# Patient Record
Sex: MALE | Race: WHITE | NOT HISPANIC OR LATINO | Employment: FULL TIME | URBAN - METROPOLITAN AREA
[De-identification: names, ages, dates, MRNs, and addresses within clinical notes are randomized per-mention and may not be internally consistent; named-entity substitution may affect disease eponyms.]

---

## 2024-10-07 ENCOUNTER — HOSPITAL ENCOUNTER (EMERGENCY)
Facility: HOSPITAL | Age: 44
Discharge: HOME | End: 2024-10-07
Attending: EMERGENCY MEDICINE | Admitting: EMERGENCY MEDICINE
Payer: OTHER MISCELLANEOUS

## 2024-10-07 VITALS
SYSTOLIC BLOOD PRESSURE: 153 MMHG | HEART RATE: 88 BPM | OXYGEN SATURATION: 98 % | TEMPERATURE: 97.1 F | BODY MASS INDEX: 31.84 KG/M2 | WEIGHT: 215 LBS | DIASTOLIC BLOOD PRESSURE: 96 MMHG | RESPIRATION RATE: 16 BRPM | HEIGHT: 69 IN

## 2024-10-07 DIAGNOSIS — Z77.21 EXPOSURE TO BLOOD OR BODY FLUID: Primary | ICD-10-CM

## 2024-10-07 DIAGNOSIS — S61.232A PUNCTURE WOUND OF RIGHT MIDDLE FINGER: ICD-10-CM

## 2024-10-07 LAB
ALBUMIN SERPL-MCNC: 4.2 G/DL (ref 3.5–5.2)
ALBUMIN/GLOB SERPL: 1.8 G/DL
ALP SERPL-CCNC: 80 U/L (ref 39–117)
ALT SERPL W P-5'-P-CCNC: 27 U/L (ref 1–41)
ANION GAP SERPL CALCULATED.3IONS-SCNC: 9.4 MMOL/L (ref 5–15)
AST SERPL-CCNC: 22 U/L (ref 1–40)
BILIRUB SERPL-MCNC: 0.6 MG/DL (ref 0–1.2)
BUN SERPL-MCNC: 13 MG/DL (ref 6–20)
BUN/CREAT SERPL: 13.5 (ref 7–25)
CALCIUM SPEC-SCNC: 9.4 MG/DL (ref 8.6–10.5)
CHLORIDE SERPL-SCNC: 105 MMOL/L (ref 98–107)
CO2 SERPL-SCNC: 24.6 MMOL/L (ref 22–29)
CREAT SERPL-MCNC: 0.96 MG/DL (ref 0.76–1.27)
DEPRECATED RDW RBC AUTO: 44 FL (ref 37–54)
EGFRCR SERPLBLD CKD-EPI 2021: 99.3 ML/MIN/1.73
ERYTHROCYTE [DISTWIDTH] IN BLOOD BY AUTOMATED COUNT: 13.3 % (ref 12.3–15.4)
GLOBULIN UR ELPH-MCNC: 2.4 GM/DL
GLUCOSE SERPL-MCNC: 189 MG/DL (ref 65–99)
HBV SURFACE AB SER RIA-ACNC: REACTIVE
HBV SURFACE AG SERPL QL IA: NORMAL
HCT VFR BLD AUTO: 44.4 % (ref 37.5–51)
HCV AB SER QL: NORMAL
HGB BLD-MCNC: 14.7 G/DL (ref 13–17.7)
HIV 1+2 AB+HIV1 P24 AG SERPL QL IA: NORMAL
MCH RBC QN AUTO: 29.8 PG (ref 26.6–33)
MCHC RBC AUTO-ENTMCNC: 33.1 G/DL (ref 31.5–35.7)
MCV RBC AUTO: 90.1 FL (ref 79–97)
PLATELET # BLD AUTO: 246 10*3/MM3 (ref 140–450)
PMV BLD AUTO: 9.1 FL (ref 6–12)
POTASSIUM SERPL-SCNC: 3.9 MMOL/L (ref 3.5–5.2)
PROT SERPL-MCNC: 6.6 G/DL (ref 6–8.5)
RBC # BLD AUTO: 4.93 10*6/MM3 (ref 4.14–5.8)
SODIUM SERPL-SCNC: 139 MMOL/L (ref 136–145)
WBC NRBC COR # BLD AUTO: 9.34 10*3/MM3 (ref 3.4–10.8)

## 2024-10-07 PROCEDURE — 25010000002 TETANUS-DIPHTH-ACELL PERTUSSIS 5-2.5-18.5 LF-MCG/0.5 SUSPENSION PREFILLED SYRINGE: Performed by: PHYSICIAN ASSISTANT

## 2024-10-07 PROCEDURE — G0432 EIA HIV-1/HIV-2 SCREEN: HCPCS | Performed by: PHYSICIAN ASSISTANT

## 2024-10-07 PROCEDURE — 90472 IMMUNIZATION ADMIN EACH ADD: CPT | Performed by: PHYSICIAN ASSISTANT

## 2024-10-07 PROCEDURE — 99283 EMERGENCY DEPT VISIT LOW MDM: CPT

## 2024-10-07 PROCEDURE — 86706 HEP B SURFACE ANTIBODY: CPT | Performed by: PHYSICIAN ASSISTANT

## 2024-10-07 PROCEDURE — 36415 COLL VENOUS BLD VENIPUNCTURE: CPT

## 2024-10-07 PROCEDURE — 86803 HEPATITIS C AB TEST: CPT | Performed by: PHYSICIAN ASSISTANT

## 2024-10-07 PROCEDURE — 90471 IMMUNIZATION ADMIN: CPT | Performed by: PHYSICIAN ASSISTANT

## 2024-10-07 PROCEDURE — 90715 TDAP VACCINE 7 YRS/> IM: CPT | Performed by: PHYSICIAN ASSISTANT

## 2024-10-07 PROCEDURE — 87340 HEPATITIS B SURFACE AG IA: CPT | Performed by: PHYSICIAN ASSISTANT

## 2024-10-07 PROCEDURE — 85027 COMPLETE CBC AUTOMATED: CPT | Performed by: PHYSICIAN ASSISTANT

## 2024-10-07 PROCEDURE — 80053 COMPREHEN METABOLIC PANEL: CPT | Performed by: PHYSICIAN ASSISTANT

## 2024-10-07 RX ORDER — EMTRICITABINE AND TENOFOVIR DISOPROXIL FUMARATE 200; 300 MG/1; MG/1
1 TABLET, FILM COATED ORAL
Status: DISCONTINUED | OUTPATIENT
Start: 2024-10-07 | End: 2024-10-07 | Stop reason: SDUPTHER

## 2024-10-07 RX ORDER — EMTRICITABINE AND TENOFOVIR DISOPROXIL FUMARATE 200; 300 MG/1; MG/1
1 TABLET, FILM COATED ORAL ONCE
Status: COMPLETED | OUTPATIENT
Start: 2024-10-07 | End: 2024-10-07

## 2024-10-07 RX ORDER — EMTRICITABINE AND TENOFOVIR DISOPROXIL FUMARATE 200; 300 MG/1; MG/1
1 TABLET, FILM COATED ORAL DAILY
Qty: 30 TABLET | Refills: 0 | Status: SHIPPED | OUTPATIENT
Start: 2024-10-07

## 2024-10-07 RX ADMIN — TETANUS TOXOID, REDUCED DIPHTHERIA TOXOID AND ACELLULAR PERTUSSIS VACCINE, ADSORBED 0.5 ML: 5; 2.5; 8; 8; 2.5 SUSPENSION INTRAMUSCULAR at 19:05

## 2024-10-07 RX ADMIN — DOLUTEGRAVIR SODIUM 50 MG: 50 TABLET, FILM COATED ORAL at 18:29

## 2024-10-07 RX ADMIN — EMTRICITABINE AND TENOFOVIR DISOPROXIL FUMARATE 1 TABLET: 200; 300 TABLET, FILM COATED ORAL at 18:29

## 2024-10-07 NOTE — ED PROVIDER NOTES
EMERGENCY DEPARTMENT ENCOUNTER  Room Number:  03/03  PCP: Provider, No Known  Independent Historians: Patient      HPI:  Chief Complaint: had concerns including Finger Laceration.     A complete HPI/ROS/PMH/PSH/SH/FH are unobtainable due to: None    Chronic or social conditions impacting patient care (Social Determinants of Health): None      Context: The patient is a 44 y.o. male with a medical history of type 1 diabetes who presents to the ED c/o acute possible body fluid exposure.  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  He states that while working today he got poked in the right middle finger by a skin hook that have been used in the OR.  He did have some bleeding.  It is a little tender, no significant pain.  Denies any functional deficit or numbness.  He is unsure when his last tetanus vaccination was.      Review of prior external notes (non-ED) -and- Review of prior external test results outside of this encounter:  Labs performed 6/21/2024.  Hemoglobin A1c was 7.2, white blood cell count 6.6, hemoglobin 15.2        PAST MEDICAL HISTORY  Active Ambulatory Problems     Diagnosis Date Noted    No Active Ambulatory Problems     Resolved Ambulatory Problems     Diagnosis Date Noted    No Resolved Ambulatory Problems     No Additional Past Medical History         PAST SURGICAL HISTORY  History reviewed. No pertinent surgical history.      FAMILY HISTORY  History reviewed. No pertinent family history.      SOCIAL HISTORY  Social History     Socioeconomic History    Marital status: Single         ALLERGIES  Penicillins      REVIEW OF SYSTEMS  Review of Systems  Included in HPI  All systems reviewed and negative except for those discussed in HPI.      PHYSICAL EXAM    I have reviewed the triage vital signs and nursing notes.    ED Triage Vitals   Temp Heart Rate Resp BP SpO2   10/07/24 1653 10/07/24 1653 10/07/24 1653 10/07/24 1655 10/07/24 1653   97.1 °F (36.2 °C) 88 16 153/96  98 %      Temp src Heart Rate Source Patient Position BP Location FiO2 (%)   10/07/24 1653 10/07/24 1653 -- -- --   Tympanic Monitor          Physical Exam  GENERAL: alert, no acute distress  SKIN: Warm, dry  HENT: Normocephalic, atraumatic  EYES: no scleral icterus  CV: regular rhythm, regular rate  RESPIRATORY: normal effort, lungs clear  ABDOMEN: nondistended  MUSCULOSKELETAL: no deformity.  On inspection of the right hand there is a pinpoint puncture wound on the palmar aspect of the right middle finger middle phalanx.  He has full range of motion sensation and strength.  NEURO: alert, moves all extremities, follows commands            LAB RESULTS  Recent Results (from the past 24 hour(s))   Hepatitis B Surface Antigen    Collection Time: 10/07/24  6:27 PM    Specimen: Arm, Right; Blood   Result Value Ref Range    Hepatitis B Surface Ag Non-Reactive Non-Reactive   Hepatitis B Surface Antibody    Collection Time: 10/07/24  6:27 PM    Specimen: Arm, Right; Blood   Result Value Ref Range    Hep B S Ab Reactive    Hepatitis C Antibody    Collection Time: 10/07/24  6:27 PM    Specimen: Arm, Right; Blood   Result Value Ref Range    Hepatitis C Ab Non-Reactive Non-Reactive   HIV-1 / O / 2 Ag / Antibody    Collection Time: 10/07/24  6:27 PM    Specimen: Arm, Right; Blood   Result Value Ref Range    HIV DUO Non-Reactive Non-Reactive   Comprehensive Metabolic Panel    Collection Time: 10/07/24  6:27 PM    Specimen: Arm, Right; Blood   Result Value Ref Range    Glucose 189 (H) 65 - 99 mg/dL    BUN 13 6 - 20 mg/dL    Creatinine 0.96 0.76 - 1.27 mg/dL    Sodium 139 136 - 145 mmol/L    Potassium 3.9 3.5 - 5.2 mmol/L    Chloride 105 98 - 107 mmol/L    CO2 24.6 22.0 - 29.0 mmol/L    Calcium 9.4 8.6 - 10.5 mg/dL    Total Protein 6.6 6.0 - 8.5 g/dL    Albumin 4.2 3.5 - 5.2 g/dL    ALT (SGPT) 27 1 - 41 U/L    AST (SGOT) 22 1 - 40 U/L    Alkaline Phosphatase 80 39 - 117 U/L    Total Bilirubin 0.6 0.0 - 1.2 mg/dL    Globulin 2.4  gm/dL    A/G Ratio 1.8 g/dL    BUN/Creatinine Ratio 13.5 7.0 - 25.0    Anion Gap 9.4 5.0 - 15.0 mmol/L    eGFR 99.3 >60.0 mL/min/1.73   CBC (No Diff)    Collection Time: 10/07/24  6:27 PM    Specimen: Arm, Right; Blood   Result Value Ref Range    WBC 9.34 3.40 - 10.80 10*3/mm3    RBC 4.93 4.14 - 5.80 10*6/mm3    Hemoglobin 14.7 13.0 - 17.7 g/dL    Hematocrit 44.4 37.5 - 51.0 %    MCV 90.1 79.0 - 97.0 fL    MCH 29.8 26.6 - 33.0 pg    MCHC 33.1 31.5 - 35.7 g/dL    RDW 13.3 12.3 - 15.4 %    RDW-SD 44.0 37.0 - 54.0 fl    MPV 9.1 6.0 - 12.0 fL    Platelets 246 140 - 450 10*3/mm3         RADIOLOGY  No Radiology Exams Resulted Within Past 24 Hours      MEDICATIONS GIVEN IN ER  Medications   Tetanus-Diphth-Acell Pertussis (BOOSTRIX) injection 0.5 mL (0.5 mL Intramuscular Given 10/7/24 1905)   dolutegravir (TIVICAY) tablet 50 mg (50 mg Oral Given 10/7/24 1829)   emtricitabine-tenofovir (TRUVADA) 200-300 MG per tablet 1 tablet (1 tablet Oral Given 10/7/24 1829)         ORDERS PLACED DURING THIS VISIT:  Orders Placed This Encounter   Procedures    Hepatitis B Surface Antigen    Hepatitis B Surface Antibody    Hepatitis C Antibody    HIV-1 / O / 2 Ag / Antibody    Comprehensive Metabolic Panel    CBC (No Diff)         OUTPATIENT MEDICATION MANAGEMENT:  No current Epic-ordered facility-administered medications on file.     Current Outpatient Medications Ordered in Epic   Medication Sig Dispense Refill    dolutegravir (TIVICAY) 50 MG tablet Take 1 tablet by mouth Daily for 28 days. 28 tablet 0    emtricitabine-tenofovir (TRUVADA) 200-300 MG per tablet Take 1 tablet by mouth Daily for 28 days. 28 tablet 0         PROCEDURES  Procedures            PROGRESS, DATA ANALYSIS, CONSULTS, AND MEDICAL DECISION MAKING  All labs have been independently interpreted by me.  All radiology studies have been reviewed by me. All EKG's have been independently viewed and interpreted by me.  Discussion below represents my analysis of pertinent  findings related to patient's condition, differential diagnosis, treatment plan and final disposition.    DIFFERENTIAL        Clinical Scores:                  ED Course as of 10/07/24 2351   Mon Oct 07, 2024   1757 Counseled patient on recommendations for labs and HIV postexposure prophylaxis.  He is agreeable. [KA]   1928 Glucose(!): 189 [KA]   1928 Creatinine: 0.96 [KA]   1928 Hepatitis B Surface Ag: Non-Reactive [KA]   1928 Hepatitis C Ab: Non-Reactive [KA]   1928 HIV DUO: Non-Reactive [KA]   1928 WBC: 9.34 [KA]   1928 Hemoglobin: 14.7 [KA]      ED Course User Index  [KA] Allison Mccarty PA-C       Patient initial labs unremarkable, prescribed 28-day course of postexposure prophylaxis for HIV.  Given him information for occupational health follow-up.      AS OF 23:51 EDT VITALS:    BP - 153/96  HR - 88  TEMP - 97.1 °F (36.2 °C) (Tympanic)  O2 SATS - 98%    COMPLEXITY OF CARE  Admission was considered but after careful review of the patient's presentation, physical examination, diagnostic results, and response to treatment the patient may be safely discharged with outpatient follow-up.      DIAGNOSIS  Final diagnoses:   Exposure to blood or body fluid   Puncture wound of right middle finger         DISPOSITION  ED Disposition       ED Disposition   Discharge    Condition   Good    Comment   --                  FOLLOW UP  [facility] OCCUPATIONAL MEDICINE [facility]   [id number]   [place] [address] [phone]  Schedule an appointment as soon as possible for a visit           Prescribed Medications     Medication List        New Prescriptions      dolutegravir 50 MG tablet  Commonly known as: TIVICAY  Take 1 tablet by mouth Daily for 28 days.     emtricitabine-tenofovir 200-300 MG per tablet  Commonly known as: TRUVADA  Take 1 tablet by mouth Daily for 28 days.               Where to Get Your Medications        These medications were sent to [facility] [place] [address]  [facility] [address]      Hours: Monday to Friday 7 AM to 6 PM, Saturday & Sunday 8 AM to 4:30 PM (Closed 12 PM to 12:30 PM) Phone: [phone]   dolutegravir 50 MG tablet  emtricitabine-tenofovir 200-300 MG per tablet                   Please note that portions of this document were completed with a voice recognition program.    Note Disclaimer: At [facility], we believe that sharing information builds trust and better relationships. You are receiving this note because you recently visited [facility]. It is possible you will see health information before a provider has talked with you about it. This kind of information can be easy to misunderstand. To help you fully understand what it means for your health, we urge you to discuss this note with your provider.         Allison Mccarty PA-C  10/07/24 [id number]